# Patient Record
Sex: FEMALE | Race: OTHER | NOT HISPANIC OR LATINO | URBAN - METROPOLITAN AREA
[De-identification: names, ages, dates, MRNs, and addresses within clinical notes are randomized per-mention and may not be internally consistent; named-entity substitution may affect disease eponyms.]

---

## 2017-02-27 ENCOUNTER — OUTPATIENT (OUTPATIENT)
Dept: OUTPATIENT SERVICES | Facility: HOSPITAL | Age: 82
LOS: 1 days | Discharge: HOME | End: 2017-02-27

## 2017-03-02 DIAGNOSIS — Z91.048 OTHER NONMEDICINAL SUBSTANCE ALLERGY STATUS: ICD-10-CM

## 2017-03-02 DIAGNOSIS — Z88.2 ALLERGY STATUS TO SULFONAMIDES: ICD-10-CM

## 2017-03-02 DIAGNOSIS — I10 ESSENTIAL (PRIMARY) HYPERTENSION: ICD-10-CM

## 2017-03-02 DIAGNOSIS — H40.20X0 UNSPECIFIED PRIMARY ANGLE-CLOSURE GLAUCOMA, STAGE UNSPECIFIED: ICD-10-CM

## 2017-03-02 DIAGNOSIS — Z85.828 PERSONAL HISTORY OF OTHER MALIGNANT NEOPLASM OF SKIN: ICD-10-CM

## 2017-03-02 DIAGNOSIS — Z91.011 ALLERGY TO MILK PRODUCTS: ICD-10-CM

## 2017-03-02 DIAGNOSIS — H26.8 OTHER SPECIFIED CATARACT: ICD-10-CM

## 2017-03-02 DIAGNOSIS — M81.0 AGE-RELATED OSTEOPOROSIS WITHOUT CURRENT PATHOLOGICAL FRACTURE: ICD-10-CM

## 2017-03-02 DIAGNOSIS — H52.01 HYPERMETROPIA, RIGHT EYE: ICD-10-CM

## 2017-03-02 DIAGNOSIS — E78.5 HYPERLIPIDEMIA, UNSPECIFIED: ICD-10-CM

## 2017-10-23 ENCOUNTER — APPOINTMENT (OUTPATIENT)
Dept: OPHTHALMOLOGY | Facility: CLINIC | Age: 82
End: 2017-10-23

## 2017-10-23 ENCOUNTER — OUTPATIENT (OUTPATIENT)
Dept: OUTPATIENT SERVICES | Facility: HOSPITAL | Age: 82
LOS: 1 days | End: 2017-10-23

## 2017-10-24 DIAGNOSIS — H26.492 OTHER SECONDARY CATARACT, LEFT EYE: ICD-10-CM

## 2017-11-20 ENCOUNTER — APPOINTMENT (OUTPATIENT)
Dept: ORTHOPEDIC SURGERY | Facility: CLINIC | Age: 82
End: 2017-11-20
Payer: MEDICARE

## 2017-11-20 DIAGNOSIS — Z87.19 PERSONAL HISTORY OF OTHER DISEASES OF THE DIGESTIVE SYSTEM: ICD-10-CM

## 2017-11-20 DIAGNOSIS — H40.9 UNSPECIFIED GLAUCOMA: ICD-10-CM

## 2017-11-20 DIAGNOSIS — K21.9 GASTRO-ESOPHAGEAL REFLUX DISEASE W/OUT ESOPHAGITIS: ICD-10-CM

## 2017-11-20 PROCEDURE — 99203 OFFICE O/P NEW LOW 30 MIN: CPT

## 2017-11-20 PROCEDURE — 73502 X-RAY EXAM HIP UNI 2-3 VIEWS: CPT | Mod: RT

## 2017-11-20 RX ORDER — LISINOPRIL 30 MG/1
30 TABLET ORAL
Qty: 90 | Refills: 0 | Status: ACTIVE | COMMUNITY
Start: 2017-05-02

## 2017-11-20 RX ORDER — OMEPRAZOLE MAGNESIUM 10 MG/1
10 GRANULE, DELAYED RELEASE ORAL
Refills: 0 | Status: COMPLETED | COMMUNITY

## 2017-11-20 RX ORDER — POLYMYXIN B SULFATE AND TRIMETHOPRIM 10000; 1 [USP'U]/ML; MG/ML
10000-0.1 SOLUTION OPHTHALMIC
Qty: 10 | Refills: 0 | Status: COMPLETED | COMMUNITY
Start: 2017-10-05

## 2017-11-20 RX ORDER — PREDNISOLONE ACETATE 10 MG/ML
1 SUSPENSION/ DROPS OPHTHALMIC
Qty: 5 | Refills: 0 | Status: COMPLETED | COMMUNITY
Start: 2017-10-23

## 2017-11-20 RX ORDER — OMEPRAZOLE 20 MG/1
20 CAPSULE, DELAYED RELEASE ORAL
Qty: 90 | Refills: 0 | Status: ACTIVE | COMMUNITY
Start: 2017-04-28

## 2018-01-24 ENCOUNTER — CLINICAL ADVICE (OUTPATIENT)
Age: 83
End: 2018-01-24

## 2018-01-26 ENCOUNTER — APPOINTMENT (OUTPATIENT)
Dept: ORTHOPEDIC SURGERY | Facility: CLINIC | Age: 83
End: 2018-01-26
Payer: MEDICARE

## 2018-01-26 DIAGNOSIS — Z86.69 PERSONAL HISTORY OF OTHER DISEASES OF THE NERVOUS SYSTEM AND SENSE ORGANS: ICD-10-CM

## 2018-01-26 PROCEDURE — 99214 OFFICE O/P EST MOD 30 MIN: CPT | Mod: 25

## 2018-01-26 PROCEDURE — 20610 DRAIN/INJ JOINT/BURSA W/O US: CPT | Mod: RT

## 2018-01-26 RX ORDER — BENZONATATE 100 MG/1
100 CAPSULE ORAL
Qty: 30 | Refills: 0 | Status: COMPLETED | COMMUNITY
Start: 2017-11-24

## 2018-01-26 RX ORDER — AZITHROMYCIN 250 MG/1
250 TABLET, FILM COATED ORAL
Qty: 6 | Refills: 0 | Status: COMPLETED | COMMUNITY
Start: 2018-01-18

## 2018-01-26 RX ORDER — METHYLPREDNISOLONE 4 MG/1
4 TABLET ORAL
Qty: 21 | Refills: 0 | Status: COMPLETED | COMMUNITY
Start: 2018-01-18

## 2018-01-26 RX ORDER — PROMETHAZINE HYDROCHLORIDE AND CODEINE PHOSPHATE 6.25; 1 MG/5ML; MG/5ML
6.25-1 SOLUTION ORAL
Qty: 200 | Refills: 0 | Status: COMPLETED | COMMUNITY
Start: 2017-11-24

## 2018-01-26 RX ORDER — PREDNISONE 20 MG/1
20 TABLET ORAL
Qty: 6 | Refills: 0 | Status: COMPLETED | COMMUNITY
Start: 2017-11-24

## 2018-02-06 ENCOUNTER — APPOINTMENT (OUTPATIENT)
Dept: ORTHOPEDIC SURGERY | Facility: CLINIC | Age: 83
End: 2018-02-06
Payer: MEDICARE

## 2018-02-06 PROCEDURE — 99213 OFFICE O/P EST LOW 20 MIN: CPT | Mod: 25

## 2018-02-06 PROCEDURE — 20610 DRAIN/INJ JOINT/BURSA W/O US: CPT | Mod: RT

## 2018-05-01 ENCOUNTER — APPOINTMENT (OUTPATIENT)
Dept: ORTHOPEDIC SURGERY | Facility: CLINIC | Age: 83
End: 2018-05-01
Payer: MEDICARE

## 2018-05-01 PROCEDURE — 99213 OFFICE O/P EST LOW 20 MIN: CPT

## 2021-02-26 ENCOUNTER — APPOINTMENT (OUTPATIENT)
Dept: ORTHOPEDIC SURGERY | Facility: CLINIC | Age: 86
End: 2021-02-26
Payer: MEDICARE

## 2021-02-26 VITALS
OXYGEN SATURATION: 98 % | WEIGHT: 122 LBS | DIASTOLIC BLOOD PRESSURE: 77 MMHG | BODY MASS INDEX: 23.03 KG/M2 | SYSTOLIC BLOOD PRESSURE: 145 MMHG | HEART RATE: 77 BPM | HEIGHT: 61 IN

## 2021-02-26 DIAGNOSIS — M70.61 TROCHANTERIC BURSITIS, RIGHT HIP: ICD-10-CM

## 2021-02-26 PROCEDURE — 73502 X-RAY EXAM HIP UNI 2-3 VIEWS: CPT | Mod: LT

## 2021-02-26 PROCEDURE — 99214 OFFICE O/P EST MOD 30 MIN: CPT

## 2021-02-26 RX ORDER — LIDOCAINE 5% 700 MG/1
5 PATCH TOPICAL
Qty: 30 | Refills: 1 | Status: COMPLETED | COMMUNITY
Start: 2018-02-06 | End: 2021-02-26

## 2021-02-26 NOTE — PHYSICAL EXAM
[LE] : Sensory: Intact in bilateral lower extremities [DP] : dorsalis pedis 2+ and symmetric bilaterally [PT] : posterior tibial 2+ and symmetric bilaterally [Normal RLE] : Right Lower Extremity: No scars, rashes, lesions, ulcers, skin intact [Normal LLE] : Left Lower Extremity: No scars, rashes, lesions, ulcers, skin intact [Normal Touch] : sensation intact for touch [Normal] : Gait: normal [de-identified] : Left hip\par gait nonantalgic over short distance walking without any assistive devices\par Left hip range of motion is with 30° flexion with mild lateral hip pain. Internal rotation to about 15° and 35° external rotation with lateral hip pain\par tender over the left lateral greater trochanteric bursa and tender at the gluteus medius insertion.\par Nontender lumbar spine and gluteus/sciatic notch.\par Intact straight leg raise and hip abduction against resistance but pain lateral hip on abduction\par Motor and sensation are intact distally.\par  [de-identified] : No respiratory distress or cough [de-identified] : \par X-rays of the LEFT hip AP and lateral views today Show no acute changes. There is osteopenia. Slight bony protuberance at the greater trochanter. No fractures seen but there may be some mild thickening of the cortex or osteopenia in the subtrochanteric area medially but does not look like a stress injury or anything related to the bisphosphonate treatment

## 2021-02-26 NOTE — ASSESSMENT
[FreeTextEntry1] : 85 yo One month of LEFT lateral hip pain most consistent with greater trochanteric bursitis and gluteal tendinopathy. This seemed similar to the pain she had a few years ago in the RIGHT hip which got better with therapy.She does have a history of osteoporosis and bisphosphonate treatment that was somewhat excessive in the past but had been off of it for many years and just recently on week last in the last 2 years. Some patients can have a pathologic subtrochanteric fracture is associated with prolonged bisphosphonate treatment. Based on her symptoms and exam I do not think that's likely at this time but if she starts to get more pain when walking and is not responding to the physical therapy then I would like for her to get an MRI scan and she knows to call me and let me know and we will order that.\par she will start therapy. She can use lidocaine patch and Voltaren gel Warm soaks and ice as needed. Home stretching and gentle strengthening.\par Followup if it's not getting better in the next 6-8 weeks or as needed.

## 2021-02-26 NOTE — HISTORY OF PRESENT ILLNESS
[de-identified] : Ms. Oneil is now 86-years-old and comes in for pain in her left hip. She was last seen 2 1/2 yrs ago for right lateral hip pain. That got better after lots of PT and never recurred.\par We did do a corticosteroid injection in the right greater trochanteric bursa 3 yrs ago as well.\par She comes in now for Pain in her LEFT hip that started one month ago for no reason. It feels like the RIGHT hip felt. It's not going away. She tried Tylenol which only helps partly and temporarily. Pain is worse when she walks a lot worse sleeping on her LEFT side and with stairs. Pain can be achy and throbbing. The other day she was walking and had to turn around after about 5 blocks to go back home because of the pain. She did start doing the exercises she learned in therapy. She cannot take NSAIDs. She was thinking she needs therapy again.\par She does have a history of osteoporosis. She has had weak last injections twice over the last 2 years. Many years ago she had been on bisphosphonate therapy orally for about 10 years and then took a drug holiday for about 7 or 8 years. The only fracture she had was the RIGHT elbow. no anterior thigh pain or anterior hip pain.Her pain is in the lateral hip near the greater trochanter.

## 2021-04-19 ENCOUNTER — APPOINTMENT (OUTPATIENT)
Dept: ORTHOPEDIC SURGERY | Facility: CLINIC | Age: 86
End: 2021-04-19
Payer: MEDICARE

## 2021-04-19 DIAGNOSIS — M54.5 LOW BACK PAIN: ICD-10-CM

## 2021-04-19 PROCEDURE — 20610 DRAIN/INJ JOINT/BURSA W/O US: CPT | Mod: LT

## 2021-04-19 PROCEDURE — 99213 OFFICE O/P EST LOW 20 MIN: CPT | Mod: 25

## 2021-04-19 NOTE — HISTORY OF PRESENT ILLNESS
[de-identified] : Ms. Oneil is now 86-years-old and comes in for pain in her left hip. She was last seen 6-7 wks ago for left lateral hip pain. \par She had been going to PT which was helping but then last week had an acute exacerbation of pain and spasm across her LB.\par She had difficulty walking when she first gets up to walk.\par Exercise helps her walk better and she warms up before walking.\par She is using a cane and can walk 5-6 blocks with a cane but is worse without it. \par Pain is variable.  Pain is in the lateral hip.  It is feeling much better this week than it did last week and massage significantly was helpful.

## 2021-04-19 NOTE — PROCEDURE
[Injection] : Injection [Left] : of the left [Greater Trochanter] : greater trochanteric bursa [Inflammation] : Inflammation [Joint Pain] : Joint pain [Patient] : patient [Risk] : risk [Benefits] : benefits [Alternatives] : alternatives [Bleeding] : bleeding [Infection] : infection [Allergic Reaction] : allergic reaction [Verbal Consent Obtained] : verbal consent was obtained prior to the procedure [Alcohol] : Alcohol [Betadine] : Betadine [Ethyl Chloride Spray] : ethyl chloride spray was used as a topical anesthetic [Lateral] : lateral [25] : a 25-gauge [1% Lidocaine___(mL)] : [unfilled] mL of 1% Lidocaine [Methylpred. 40mg/mL___(mL)] : [unfilled] mL 40mg/mL methylprednisolone [Bandage Applied] : a bandage [Tolerated Well] : The patient tolerated the procedure well [None] : none [No Strenuous Activity___day(s)] : avoid strenuous activity for [unfilled] day(s) [PRN] : as needed

## 2021-04-19 NOTE — PHYSICAL EXAM
[LE] : Sensory: Intact in bilateral lower extremities [DP] : dorsalis pedis 2+ and symmetric bilaterally [PT] : posterior tibial 2+ and symmetric bilaterally [Normal RLE] : Right Lower Extremity: No scars, rashes, lesions, ulcers, skin intact [Normal LLE] : Left Lower Extremity: No scars, rashes, lesions, ulcers, skin intact [Normal Touch] : sensation intact for touch [Normal] : Oriented to person, place, and time, insight and judgement were intact and the affect was normal [de-identified] : Left hip\par gait nonantalgic over short distance walking without any assistive devices\par Left hip range of motion is with 130° flexion with mild lateral hip pain. Internal rotation to about 15° and 35° external rotation with lateral hip pain\par tender over the left lateral greater trochanteric bursa and tender at the gluteus medius insertion.\par Nontender lumbar spine and gluteus/sciatic notch.\par Intact straight leg raise and hip abduction against resistance but pain lateral hip on abduction\par Motor and sensation are intact distally.\par  [de-identified] : No respiratory distress or cough [de-identified] : \par X-rays of the LEFT hip AP and lateral views 2/26/21 showed no acute changes. There is osteopenia. Slight bony protuberance at the greater trochanter. No fractures seen but there may be some mild thickening of the cortex or osteopenia in the subtrochanteric area medially but does not look like a stress injury or anything related to the bisphosphonate treatment

## 2021-04-19 NOTE — ASSESSMENT
[FreeTextEntry1] : 87 yo 4 months of LEFT lateral hip pain most consistent with greater trochanteric bursitis and gluteal tendinopathy.\par She was having significant back pain as well as hip pain last week.  The therapy was helpful but it may have been a therapy that has aggravated it as well so she needs to be careful.\par I recommended trying a steroid injection which we did today.  Hopefully this will give her relief.  She will continue with the therapy.  Activity as tolerated.  Gentle stretching and strengthening.\par Tylenol as needed for pain\par Followup if it's not getting better in the next 6-8 weeks or as needed.

## 2022-05-04 ENCOUNTER — APPOINTMENT (OUTPATIENT)
Dept: ORTHOPEDIC SURGERY | Facility: CLINIC | Age: 87
End: 2022-05-04
Payer: MEDICARE

## 2022-05-04 DIAGNOSIS — M25.552 PAIN IN LEFT HIP: ICD-10-CM

## 2022-05-04 PROCEDURE — 73502 X-RAY EXAM HIP UNI 2-3 VIEWS: CPT | Mod: LT

## 2022-05-04 PROCEDURE — 99213 OFFICE O/P EST LOW 20 MIN: CPT | Mod: 25

## 2022-05-04 PROCEDURE — 20610 DRAIN/INJ JOINT/BURSA W/O US: CPT | Mod: LT

## 2022-05-04 NOTE — PROCEDURE
[Injection] : Injection [Left] : of the left [Greater Trochanter] : greater trochanteric bursa [Inflammation] : Inflammation [Patient] : patient [Risk] : risk [Benefits] : benefits [Alternatives] : alternatives [Bleeding] : bleeding [Infection] : infection [Allergic Reaction] : allergic reaction [Verbal Consent Obtained] : verbal consent was obtained prior to the procedure [Alcohol] : Alcohol [Ethyl Chloride Spray] : ethyl chloride spray was used as a topical anesthetic [Lateral] : lateral [22] : a 22-gauge [1% Lidocaine___(mL)] : [unfilled] mL of 1% Lidocaine [Triamcinolone 40mg/mL___(mL)] : [unfilled] ~UmL of 40mg/mL triamcinolone [Bandage Applied] : a bandage [Tolerated Well] : The patient tolerated the procedure well [None] : none [No Strenuous Activity___day(s)] : avoid strenuous activity for [unfilled] day(s) [PRN] : as needed [FreeTextEntry1] : ChloraPrep

## 2022-05-04 NOTE — HISTORY OF PRESENT ILLNESS
[de-identified] : Mara is now 87 years old and comes in for follow up for her LEFT hip pain. She was last seen in April 2021 and we did an injection to the greater trochanteric bursa.  This injection was very helpful and her pain went away.\par Her hip pain started to return about a month ago without any specific incident. She started to do some home exercises that she has done in the past until she took a fall 2 weeks ago. She was in her bedroom and slid off bed hitting her head. She had a concussion and hematoma in the cervical spine that she is recovering from. Since then her hip pain has gotten much worse. She is using a cane for the past couple weeks. She has pain going down her anterior thigh. She rates pain 2/10 currently and 10/10 at its worst. She has taken extra strength Tylenol on bad days. No current numbness or tinging.\par Her balance is somewhat off and she feels somewhat dizzy with the head and neck trauma.  She has not been cleared to do therapy or exercise yet.

## 2022-05-04 NOTE — PHYSICAL EXAM
[LE] : Sensory: Intact in bilateral lower extremities [Normal RLE] : Right Lower Extremity: No scars, rashes, lesions, ulcers, skin intact [Normal LLE] : Left Lower Extremity: No scars, rashes, lesions, ulcers, skin intact [Normal Touch] : sensation intact for touch [Normal] : Oriented to person, place, and time, insight and judgement were intact and the affect was normal [DP] : dorsalis pedis 2+ and symmetric bilaterally [PT] : posterior tibial 2+ and symmetric bilaterally [de-identified] : Left hip\par gait nonantalgic over short distance walking without any assistive devices\par Left hip range of motion is with 130° flexion with mild lateral hip pain. Internal rotation to about 15° and 35° external rotation with lateral hip pain\par tender over the left lateral greater trochanteric bursa and tender at the gluteus medius insertion.\par Nontender lumbar spine and gluteus/sciatic notch.\par Intact straight leg raise.  Pain and weakness 3+/5  with left hip abduction against resistance but pain lateral hip on abduction\par Motor and sensation are intact distally.\par  [de-identified] : No respiratory distress or cough [de-identified] : \par \par X-rays taken of LEFT hip today compared to x-rays on 2/26/21 showed no acute changes. There is osteopenia. Slightly bony protuberance at the greater trochanter. No fractures seen but there may be some mild thickening of the cortex or osteopenia in the subtrochanteric area medially but does not look like a stress injury or anything related to the bisphosphonate treatment.

## 2022-05-04 NOTE — ASSESSMENT
[FreeTextEntry1] : 86 y/o with LEFT lateral hip pain most consistent with greater trochanteric bursitis and gluteal tendinopathy.\par Steroid injection a year ago was very helpful and she felt much better.  We did another injection today which hopefully will help.  She was also given a prescription for therapy which she can start but should get cleared by her  neurologist first.\par Heat and ice as needed.  Tylenol if needed.  Follow-up if it does not get better.

## 2022-06-29 ENCOUNTER — APPOINTMENT (OUTPATIENT)
Dept: OPHTHALMOLOGY | Facility: CLINIC | Age: 87
End: 2022-06-29

## 2022-06-29 ENCOUNTER — NON-APPOINTMENT (OUTPATIENT)
Age: 87
End: 2022-06-29

## 2022-06-29 PROCEDURE — 92134 CPTRZ OPH DX IMG PST SGM RTA: CPT

## 2022-06-29 PROCEDURE — 92004 COMPRE OPH EXAM NEW PT 1/>: CPT

## 2022-10-06 ENCOUNTER — APPOINTMENT (OUTPATIENT)
Dept: ORTHOPEDIC SURGERY | Facility: CLINIC | Age: 87
End: 2022-10-06

## 2022-10-06 PROCEDURE — 99214 OFFICE O/P EST MOD 30 MIN: CPT

## 2022-10-06 NOTE — HISTORY OF PRESENT ILLNESS
[de-identified] : Mara is now 87 years old and comes in for follow up for her LEFT hip pain. \par She was last seen in May 2022 and she had a steroid injection to greater trochanter bursa. She had a previous injection in 2021.\par Her pain was much better after the injection and did PT and was good. She was walking miles. \par Her hip pain started to return 3 wks ago after an intense PT session. \par There is pain in the lateral hip and anterior thigh and disturbs her sleep.\par It is 2-9/10. She takes Tylenol 2 /day (can't take NSAIDs).\par She is frustrated because it had been doing well.  We had MRI of her right hip about 5 years ago but she never had an MRI of the left

## 2022-10-06 NOTE — ASSESSMENT
[FreeTextEntry1] : 86 y/o with LEFT lateral hip pain most consistent with greater trochanteric bursitis and gluteal tendinopathy.  She could have a tear or strain of the gluteal tendons from the recent exacerbation of pain 3 weeks ago.  She had been doing really well but then did more intense exercises which seem to aggravate the pain.  In the future I would leave well enough alone and not push too hard which could cause recurrence like this.\par Given that we have done a few injections and she has had It for a while and I would get an MRI before doing 1/3 injection.\par She will first give it a few weeks to see if it gets better on its own with ice, heat, gentle exercises, cane, Tylenol and Voltaren gel.\par She should come in if she gets the MRI and depending on the findings we may consider doing a steroid injection again.  Even with a tear of the gluteal tendons at her age she likely would not do the surgery.\par Follow-up about 4 weeks or as needed depending on how it feels

## 2022-10-06 NOTE — PHYSICAL EXAM
[LE] : Sensory: Intact in bilateral lower extremities [Normal RLE] : Right Lower Extremity: No scars, rashes, lesions, ulcers, skin intact [Normal LLE] : Left Lower Extremity: No scars, rashes, lesions, ulcers, skin intact [Normal Touch] : sensation intact for touch [Normal] : Oriented to person, place, and time, insight and judgement were intact and the affect was normal [DP] : dorsalis pedis 2+ and symmetric bilaterally [PT] : posterior tibial 2+ and symmetric bilaterally [de-identified] : Left hip\par gait nonantalgic over short distance walking without any assistive devices\par Left hip range of motion is with 130° flexion with mild lateral hip pain. Internal rotation to about 15° and 35° external rotation with lateral hip pain\par tender over the left lateral greater trochanteric bursa and mildly tender at the gluteus medius insertion.\par Nontender lumbar spine and gluteus/sciatic notch.\par Intact straight leg raise.  Pain and weakness 4+/5  with left hip abduction against resistance but pain lateral hip on abduction\par Motor and sensation are intact distally.\par  [de-identified] : No respiratory distress or cough [de-identified] : \par \par X-rays taken of LEFT hip on 5/4/22 compared to xray on 2/26/21 showed no acute changes. There is osteopenia. Slightly bony protuberance at the greater trochanter. No fractures seen but there may be some mild thickening of the cortex or osteopenia in the subtrochanteric area medially but does not look like a stress injury or anything related to the bisphosphonate treatment.

## 2022-10-26 ENCOUNTER — APPOINTMENT (OUTPATIENT)
Dept: ORTHOPEDIC SURGERY | Facility: CLINIC | Age: 87
End: 2022-10-26

## 2022-10-26 PROCEDURE — 99213 OFFICE O/P EST LOW 20 MIN: CPT | Mod: 25

## 2022-10-26 PROCEDURE — 20610 DRAIN/INJ JOINT/BURSA W/O US: CPT | Mod: 59,LT

## 2022-10-26 NOTE — PROCEDURE
[Injection] : Injection [Left] : of the left [Greater Trochanter] : greater trochanteric bursa [Inflammation] : Inflammation [Patient] : patient [Risk] : risk [Benefits] : benefits [Alternatives] : alternatives [Bleeding] : bleeding [Infection] : infection [Allergic Reaction] : allergic reaction [Verbal Consent Obtained] : verbal consent was obtained prior to the procedure [Alcohol] : Alcohol [Ethyl Chloride Spray] : ethyl chloride spray was used as a topical anesthetic [Lateral] : lateral [Direct] : direct [22] : a 22-gauge [1% Lidocaine___(mL)] : [unfilled] mL of 1% Lidocaine [Triamcinolone 40mg/mL___(mL)] : [unfilled] ~UmL of 40mg/mL triamcinolone [Bandage Applied] : a bandage [Tolerated Well] : The patient tolerated the procedure well [None] : none [No Strenuous Activity___day(s)] : avoid strenuous activity for [unfilled] day(s) [FreeTextEntry1] : ChloraPrep

## 2022-10-26 NOTE — ASSESSMENT
[FreeTextEntry1] : 86 y/o with LEFT lateral hip pain with greater trochanteric bursitis and gluteal tendinopathy.  No tear seen on the MRI.  Given the exacerbation of her pain which is ongoing for a month now we decided to try another steroid injection which had been very helpful when she had one 5 months ago.\par She can do some gentle home exercises and heat and ice and Tylenol as needed.  Cane as needed.  Follow-up if it does not get better in the next several weeks or as needed.

## 2022-10-26 NOTE — HISTORY OF PRESENT ILLNESS
[de-identified] : 86 yo comes in for follow up for her LEFT lateral hip pain. \par In May 2022 she had a steroid injection to greater trochanter bursa which really helped her pain until about a month ago.  She has ongoing pain in the lateral hip over the last month.  Pain can be variable but at times quite severe.  She has rested and tried stretching and exercises without relief.  It was aggravated in physical therapy since she has not gone back.  She has tried Tylenol.\par She went for the MRI October 18 described below.

## 2022-10-26 NOTE — PHYSICAL EXAM
[LE] : Sensory: Intact in bilateral lower extremities [DP] : dorsalis pedis 2+ and symmetric bilaterally [PT] : posterior tibial 2+ and symmetric bilaterally [Normal RLE] : Right Lower Extremity: No scars, rashes, lesions, ulcers, skin intact [Normal LLE] : Left Lower Extremity: No scars, rashes, lesions, ulcers, skin intact [Normal Touch] : sensation intact for touch [Normal] : Oriented to person, place, and time, insight and judgement were intact and the affect was normal [de-identified] : Left hip\par gait nonantalgic over short distance walking without any assistive devices\par Left hip range of motion is with 130° flexion with mild lateral hip pain. Internal rotation to about 15° and 35° external rotation with lateral hip pain\par tender over the left lateral greater trochanteric bursa and nontender at the gluteus medius insertion.\par Nontender lumbar spine and gluteus/sciatic notch.\par Intact straight leg raise.  Pain and weakness 5-/5  with left hip abduction against resistance but pain lateral hip on abduction\par Motor and sensation are intact distally.\par  [de-identified] : No respiratory distress or cough [de-identified] : \par \par X-rays taken of LEFT hip today compared to x-rays on 2/26/21 showed no acute changes. There is osteopenia. Slightly bony protuberance at the greater trochanter. 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

## 2024-01-12 ENCOUNTER — APPOINTMENT (OUTPATIENT)
Dept: ORTHOPEDIC SURGERY | Facility: CLINIC | Age: 89
End: 2024-01-12
Payer: MEDICARE

## 2024-01-12 DIAGNOSIS — M17.11 UNILATERAL PRIMARY OSTEOARTHRITIS, RIGHT KNEE: ICD-10-CM

## 2024-01-12 PROCEDURE — 99214 OFFICE O/P EST MOD 30 MIN: CPT

## 2024-01-12 PROCEDURE — 72100 X-RAY EXAM L-S SPINE 2/3 VWS: CPT

## 2024-01-12 PROCEDURE — 73562 X-RAY EXAM OF KNEE 3: CPT | Mod: RT

## 2024-01-12 NOTE — ASSESSMENT
[FreeTextEntry1] : 88-year-old about 3 weeks following a fall with a L1 compression fracture which is between 50 and 75% based on x-rays today and likely progressed since her initial x-ray which is common.  Fracture should heal more in the next 3 to 4 weeks.  Symptomatic treatment.  Avoid painful activities.  Tylenol for pain as she has been doing and perhaps will add in ibuprofen which may help her pain more. If it irritates her stomach then she will stop the ibuprofen.  If the pain is more severe and she wants to try some narcotics we can do that but she understands it could make her constipated and she already has bowel issues so would like to avoid that.  It is good to get up and walk around. Heat and ice as needed.  I will see her back in 3 weeks to make sure the fracture is healing and if it is doing better I may refer her for physical therapy for the back as well as for her knee and balance and strengthening and fall prevention.  The knee has some arthritis and perhaps because she was moving quickly the knee buckled or gave out. She should move more slowly in the future to try to prevent falls as well. She has some left rib pain which may be a bone bruise.  She had a CT scan of the ribs in the emergency room negative for fracture but given the ongoing tenderness there could have been a nondisplaced fracture that was missed.  In any case it would be symptomatic treatment so I reassured her. Follow-up in 3 weeks.

## 2024-01-12 NOTE — REASON FOR VISIT
[Initial Visit] : an initial visit for [Knee Pain] : knee pain [FreeTextEntry2] : Lumbar compression fracture
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
## 2024-01-12 NOTE — PHYSICAL EXAM
[Slightly Antalgic] : slightly antalgic [LE] : Sensory: Intact in bilateral lower extremities [Normal RLE] : Right Lower Extremity: No scars, rashes, lesions, ulcers, skin intact [Normal LLE] : Left Lower Extremity: No scars, rashes, lesions, ulcers, skin intact [Normal Touch] : sensation intact for touch [Normal] : Oriented to person, place, and time, insight and judgement were intact and the affect was normal [de-identified] : Low back Flat back. She is walking well. Motor and sensation intact bilateral lower extremities.  Intact anterior tibial tendon, gastrocsoleus, EHL. Tender in the lumbar spine about the level. Pain with lumbar spine range of motion.  Right knee No effusion. No edema, ecchymoses, erythema. Knee range of motion is 0 to 120 degrees when sitting and standing.  Because of her back pain I did not have her lie down for exam.  Nonantalgic gait. Intact extensor mechanism. [de-identified] : She has a report from the emergency room 12/21/2023 stating there is CT lumbar spine with mild superior endplate compression deformity of L1 less than 25% of the height.  X-rays lumbar spine AP and lateral views performed today to follow-up on the fracture were ordered, performed and reviewed showing about 60% compression of the L1 vertebrae which sounds greater than what was reported from the emergency room.  There is multilevel spondylosis and degenerative disc disease and mild spondylolisthesis.  X-rays of the right knee weightbearing 3 views today showed mild to moderate joint space narrowing and osteophytes consistent with mild osteoarthritis of the right knee

## 2024-01-25 ENCOUNTER — APPOINTMENT (OUTPATIENT)
Dept: ORTHOPEDIC SURGERY | Facility: CLINIC | Age: 89
End: 2024-01-25

## 2024-02-02 ENCOUNTER — APPOINTMENT (OUTPATIENT)
Dept: ORTHOPEDIC SURGERY | Facility: CLINIC | Age: 89
End: 2024-02-02
Payer: MEDICARE

## 2024-02-02 PROCEDURE — 72100 X-RAY EXAM L-S SPINE 2/3 VWS: CPT

## 2024-02-02 PROCEDURE — 99213 OFFICE O/P EST LOW 20 MIN: CPT
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
## 2024-02-02 NOTE — REASON FOR VISIT
[Follow-Up Visit] : a follow-up visit for [Spouse] : spouse [FreeTextEntry2] : L1 compression fracture

## 2024-02-02 NOTE — ASSESSMENT
[FreeTextEntry1] : 88-year-old about 6 weeks following a fall with a L1 compression fracture which clinically is improving and radiographically the fracture compression appears to have stabilized at about 65%. It should continue to improve and slowly as it is getting better she can increase activity and decrease taking the medication.  Particularly the Advil which irritates her stomach she should try to add back on. Continue to use heat and ice and patches as needed.  In 2 to 3 weeks she likely can start some physical therapy and work on some strengthening and conditioning. She feels that her posture is not as good which is from the compression more anteriorly and also she notices her waist is shorter. She has osteoporosis and has been on Reclast for 5 years.  She will be seeing her endocrinologist soon and will get a new DEXA scan.  The problem with other medications such as Forteo which could build bone better is that it may be contraindicated with her history of pancreatitis. She should do weightbearing exercises and eat well and to supplement with vitamin D and calcium. Follow-up in about 6 weeks.

## 2024-02-27 ENCOUNTER — APPOINTMENT (OUTPATIENT)
Dept: ORTHOPEDIC SURGERY | Facility: CLINIC | Age: 89
End: 2024-02-27
Payer: MEDICARE

## 2024-02-27 DIAGNOSIS — M67.952 UNSPECIFIED DISORDER OF SYNOVIUM AND TENDON, LEFT THIGH: ICD-10-CM

## 2024-02-27 DIAGNOSIS — S32.010D WEDGE COMPRESSION FRACTURE OF FIRST LUMBAR VERTEBRA, SUBSEQUENT ENCOUNTER FOR FRACTURE WITH ROUTINE HEALING: ICD-10-CM

## 2024-02-27 DIAGNOSIS — M47.816 SPONDYLOSIS W/OUT MYELOPATHY OR RADICULOPATHY, LUMBAR REGION: ICD-10-CM

## 2024-02-27 DIAGNOSIS — M70.62 TROCHANTERIC BURSITIS, LEFT HIP: ICD-10-CM

## 2024-02-27 PROCEDURE — 99213 OFFICE O/P EST LOW 20 MIN: CPT | Mod: 25

## 2024-02-27 PROCEDURE — 20610 DRAIN/INJ JOINT/BURSA W/O US: CPT | Mod: 59,LT

## 2024-02-27 NOTE — PHYSICAL EXAM
[Slightly Antalgic] : slightly antalgic [LE] : Sensory: Intact in bilateral lower extremities [Normal RLE] : Right Lower Extremity: No scars, rashes, lesions, ulcers, skin intact [Normal LLE] : Left Lower Extremity: No scars, rashes, lesions, ulcers, skin intact [Normal Touch] : sensation intact for touch [Normal] : Oriented to person, place, and time, insight and judgement were intact and the affect was normal [de-identified] : Low back Flat back. Mild limp with pain left hip and slight forward flexion.  She can raise on her heels and toes. No back pain changing positions. Motor and sensation intact bilateral lower extremities.  Intact anterior tibial tendon, gastrocsoleus, EHL. Nontender in the lumbar spine about the level of the fracture at L1. No significant back pain with range of motion. Left hip is very tender over the greater trochanter and gluteal insertion. She can do a hip abduction but with mild weakness and pain. Passive range of motion of the left hip is with no groin pain and good motion.  [de-identified] :  AP and lateral x-rays of the left hip today to follow-up on hip pain and lateral x-ray of the lumbar spine to follow-up on the fracture of the lumbar vertebrae show stable compression fracture of L1 unchanged from last x-ray.  No abnormalities in the left proximal femur or hip joint except mild narrowing.  X-rays lumbar spine AP and lateral views performed 2/2/24 to follow-up on the fracture were reviewed showing about 66% compression of the L1 vertebrae similar to last visit but greater than her initial imaging by the report. Fracture appears to have stabilized.  Degenerative changes present

## 2024-02-27 NOTE — HISTORY OF PRESENT ILLNESS
[de-identified] : 88-year-old comes in for f/u evaluation for back injury that occurred at her home 12/21/2023 when she fell.   Her back is feeling much better. She felt she would be ready to start physical therapy soon. She has continued intermitted use of a back wrap/brace. For the past 5 days she had severe LEFT lateral hip pain over lateral hip. She has been using a cane to help her walk even at home going across the room. Yesterday pain was 9/10. She is ok lying down but pain increases when upright. She has been taking 2 Tylenol + 1 Advil daily which does help temporarily. She has also iced. She had LEFT greater trochanter injections in May and October 2022 that were very helpful.    
Respiratory

## 2024-02-27 NOTE — PROCEDURE
[Injection] : Injection [Left] : of the left [Greater Trochanter] : greater trochanteric bursa [Patient] : patient [Inflammation] : Inflammation [Risk] : risk [Benefits] : benefits [Alternatives] : alternatives [Bleeding] : bleeding [Infection] : infection [Allergic Reaction] : allergic reaction [Verbal Consent Obtained] : verbal consent was obtained prior to the procedure [Alcohol] : Alcohol [Ethyl Chloride Spray] : ethyl chloride spray was used as a topical anesthetic [Direct] : direct [1% Lidocaine___(mL)] : [unfilled] mL of 1% Lidocaine [Triamcinolone 40mg/mL___(mL)] : [unfilled] ~UmL of 40mg/mL triamcinolone [Bandage Applied] : a bandage [Tolerated Well] : The patient tolerated the procedure well [None] : none [No Strenuous Activity___day(s)] : avoid strenuous activity for [unfilled] day(s) [PRN] : as needed [22] : a 22-gauge [FreeTextEntry1] : chloraprep

## 2024-02-27 NOTE — ASSESSMENT
[FreeTextEntry1] : 88-year-old about 7 weeks following a fall with a L1 compression fracture which clinically is improving and radiographically the fracture compression appears to have stabilized at about 65% compression. 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  In 2022 we had done 2 steroid injections in this area but none in the past year and a half.  Today I did another steroid injection in the bursa. Patient will take the Tylenol and ibuprofen as needed and heat and ice. She feels that her posture is not as good which is from the compression more anteriorly and also she notices her waist is shorter. She has osteoporosis and has been on Reclast for 5 years.  She will be seeing her endocrinologist soon and will get a new DEXA scan.   Follow-up in about 6 weeks.

## 2024-05-02 ENCOUNTER — NON-APPOINTMENT (OUTPATIENT)
Age: 89
End: 2024-05-02

## 2024-10-16 ENCOUNTER — APPOINTMENT (OUTPATIENT)
Dept: ORTHOPEDIC SURGERY | Facility: CLINIC | Age: 89
End: 2024-10-16
Payer: MEDICARE

## 2024-10-16 DIAGNOSIS — M25.562 PAIN IN LEFT KNEE: ICD-10-CM

## 2024-10-16 DIAGNOSIS — G89.29 PAIN IN LEFT KNEE: ICD-10-CM

## 2024-10-16 DIAGNOSIS — M17.12 UNILATERAL PRIMARY OSTEOARTHRITIS, LEFT KNEE: ICD-10-CM

## 2024-10-16 PROCEDURE — 73564 X-RAY EXAM KNEE 4 OR MORE: CPT | Mod: LT

## 2024-10-16 PROCEDURE — 99213 OFFICE O/P EST LOW 20 MIN: CPT

## 2024-11-14 NOTE — ASU PATIENT PROFILE, ADULT - NSICDXPASTMEDICALHX_GEN_ALL_CORE_FT
PAST MEDICAL HISTORY:  History of basal cell carcinoma excision     History of pancreatitis     HTN (hypertension)     Hyperlipidemia     Osteoporosis

## 2024-11-14 NOTE — ASU PATIENT PROFILE, ADULT - FALL HARM RISK - HARM RISK INTERVENTIONS

## 2024-11-14 NOTE — ASU PATIENT PROFILE, ADULT - NS PREOP UNDERSTANDS INFO
No solid food/dairy/candy/gum after midnight tonight; water allowed before 06:30am tomorrow; patient reminded to come with photo ID/insurance/credit card; dress in comfortable clothes; no jewelries/contact lens; no smoking/alcohol consumption/recreational drug use today; escort must have an ID card; address and callback number was given/yes

## 2024-11-14 NOTE — ASU PATIENT PROFILE, ADULT - NSICDXPASTSURGICALHX_GEN_ALL_CORE_FT
PAST SURGICAL HISTORY:  H/O basal cell carcinoma excision multiple    H/O squamous cell carcinoma excision multiple    History of chronic rhinitis     History of D&C     S/P lumpectomy, left breast DCIS    Squamous cell skin cancer

## 2024-11-14 NOTE — ASU PATIENT PROFILE, ADULT - ABILITY TO HEAR (WITH HEARING AID OR HEARING APPLIANCE IF NORMALLY USED):
Bilateral hearing Aid/Mildly to Moderately Impaired: difficulty hearing in some environments or speaker may need to increase volume or speak distinctly

## 2024-11-15 ENCOUNTER — TRANSCRIPTION ENCOUNTER (OUTPATIENT)
Age: 89
End: 2024-11-15

## 2024-11-15 ENCOUNTER — OUTPATIENT (OUTPATIENT)
Dept: OUTPATIENT SERVICES | Facility: HOSPITAL | Age: 89
LOS: 1 days | Discharge: HOME | End: 2024-11-15

## 2024-11-15 VITALS
HEIGHT: 60 IN | RESPIRATION RATE: 15 BRPM | TEMPERATURE: 99 F | DIASTOLIC BLOOD PRESSURE: 75 MMHG | HEART RATE: 69 BPM | SYSTOLIC BLOOD PRESSURE: 138 MMHG | OXYGEN SATURATION: 98 % | WEIGHT: 119.93 LBS

## 2024-11-15 VITALS
OXYGEN SATURATION: 95 % | RESPIRATION RATE: 16 BRPM | HEART RATE: 68 BPM | TEMPERATURE: 98 F | DIASTOLIC BLOOD PRESSURE: 55 MMHG | SYSTOLIC BLOOD PRESSURE: 116 MMHG

## 2024-11-15 DIAGNOSIS — Z98.890 OTHER SPECIFIED POSTPROCEDURAL STATES: Chronic | ICD-10-CM

## 2024-11-15 DIAGNOSIS — C44.92 SQUAMOUS CELL CARCINOMA OF SKIN, UNSPECIFIED: Chronic | ICD-10-CM

## 2024-11-15 RX ORDER — ACETAMINOPHEN 500 MG
2 TABLET ORAL
Qty: 0 | Refills: 0 | DISCHARGE
Start: 2024-11-15

## 2024-11-15 RX ORDER — OMEPRAZOLE 10 MG
1 CAPSULE,DELAYED RELEASE (ENTERIC COATED) ORAL
Refills: 0 | DISCHARGE

## 2024-11-15 RX ORDER — LISINOPRIL 40 MG
1 TABLET ORAL
Refills: 0 | DISCHARGE

## 2024-11-15 RX ORDER — ACETAMINOPHEN 500 MG
650 TABLET ORAL ONCE
Refills: 0 | Status: DISCONTINUED | OUTPATIENT
Start: 2024-11-15 | End: 2024-11-15

## 2024-11-15 RX ORDER — HYDROCHLOROTHIAZIDE 50 MG
1 TABLET ORAL
Refills: 0 | DISCHARGE

## 2024-11-15 RX ORDER — ONDANSETRON HYDROCHLORIDE 2 MG/ML
4 INJECTION, SOLUTION INTRAMUSCULAR; INTRAVENOUS ONCE
Refills: 0 | Status: DISCONTINUED | OUTPATIENT
Start: 2024-11-15 | End: 2024-11-15

## 2024-11-15 RX ORDER — ROSUVASTATIN CALCIUM 10 MG
1 TABLET ORAL
Refills: 0 | DISCHARGE

## 2024-11-15 RX ORDER — ATENOLOL 100 MG
1 TABLET ORAL
Refills: 0 | DISCHARGE

## 2024-11-15 NOTE — PRE-ANESTHESIA EVALUATION ADULT - NSANTHAIRWAYFT_ENT_ALL_CORE
Neck: full range of motion  Unrestricted mouth opening  Thyromental distance >3 cm Neck: mildly limited neck extension due to discomfort  Unrestricted mouth opening  Thyromental distance >3 cm

## 2024-11-15 NOTE — ASU DISCHARGE PLAN (ADULT/PEDIATRIC) - NS MD DC FALL RISK RISK
For information on Fall & Injury Prevention, visit: [email or web address] OR  [email or web address] OR  https://www.cdc.gov/steadi/patient.html

## 2024-11-15 NOTE — OPERATIVE REPORT - OPERATIVE RPOSRT DETAILS
Operative Note    Preoperative diagnosis: Mechanical ptosis, bilateral upper eyelids   Postoperative diagnosis: same  Procedure: Bilateral upper eyelid ptosis repair via functional blepharoplasty  Attending surgeon: Iris Pascual MD  Assistant surgeon: Joni Raines MD  Anesthesia: MAC with local injection (2% lidocaine with 1:100,000 epinephrine)  EBL: <5cc  Complications: none    Procedure:  After obtaining full informed consent from the patient, the patient was brought to the operating room.  A surgical time-out was performed.     Topical proparacaine was instilled into each eye. The proposed incision sites along each upper eyelid were marked with a sterile marking pen to be symmetric.  The above mentioned anesthetic mixture was injected in a subcutaneous fashion along each upper eyelid in the region of the proposed incision sites.  Pressure was applied to each upper eyelid.  The patient's face was prepped and draped in the usual sterile fashion for ophthalmic plastic surgery. Sterile corneal shields were placed in both eyes.     Attention was directed to the right upper eyelid.  A #15 blade was used to make a skin incision in the previously made marks.  Skin and orbicularis muscle were excised with Mona scissors.   Hemostasis was obtained with pressure and bipolar cautery. Graded contouring of the septum and orbicularis muscle was performed with bipolar cautery.  Hemostasis was assured with bipolar cautery.    An identical procedure was performed on the left upper eyelid.  Care was taken throughout to assure symmetry.    The wounds were closed with interrupted 6-0 nylon sutures.  The corneal shields were removed. Ophthalmic ointment was applied the operative sites and to each eye.    The patient tolerated the procedure well and there were no complications.  The patient was brought to the recovery room in stable condition.

## 2024-11-15 NOTE — ASU DISCHARGE PLAN (ADULT/PEDIATRIC) - FINANCIAL ASSISTANCE
[facility] provides services at a reduced cost to those who are determined to be eligible through [facility]’s financial assistance program. Information regarding [facility]’s financial assistance program can be found by going to [email or web address] or by calling [phone].

## 2025-03-13 ENCOUNTER — APPOINTMENT (OUTPATIENT)
Dept: ORTHOPEDIC SURGERY | Facility: CLINIC | Age: 89
End: 2025-03-13
Payer: MEDICARE

## 2025-03-13 DIAGNOSIS — M25.562 PAIN IN LEFT KNEE: ICD-10-CM

## 2025-03-13 DIAGNOSIS — M17.12 UNILATERAL PRIMARY OSTEOARTHRITIS, LEFT KNEE: ICD-10-CM

## 2025-03-13 DIAGNOSIS — G89.29 PAIN IN LEFT KNEE: ICD-10-CM

## 2025-03-13 PROCEDURE — 99213 OFFICE O/P EST LOW 20 MIN: CPT | Mod: 25

## 2025-03-13 PROCEDURE — 20610 DRAIN/INJ JOINT/BURSA W/O US: CPT | Mod: 59,LT

## (undated) DEVICE — PACK BLEPHAROPLASTY

## (undated) DEVICE — NDL HYPO SAFE 25G X 1.5" (ORANGE)

## (undated) DEVICE — PREP BETADINE 5% STERILE OPTHALMIC SOLUTION

## (undated) DEVICE — MEE-ESU VALLEYLAB T2J57286DX: Type: DURABLE MEDICAL EQUIPMENT

## (undated) DEVICE — WARMING BLANKET LOWER ADULT

## (undated) DEVICE — SYR LUER LOK 5CC

## (undated) DEVICE — APPLICATOR COTTON TIP 6"

## (undated) DEVICE — GLV 8 PROTEXIS (WHITE)